# Patient Record
Sex: FEMALE | Race: WHITE | NOT HISPANIC OR LATINO | Employment: STUDENT | URBAN - METROPOLITAN AREA
[De-identification: names, ages, dates, MRNs, and addresses within clinical notes are randomized per-mention and may not be internally consistent; named-entity substitution may affect disease eponyms.]

---
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
## 2021-02-05 ENCOUNTER — OFFICE VISIT (OUTPATIENT)
Dept: DERMATOLOGY | Facility: CLINIC | Age: 21
End: 2021-02-05
Payer: COMMERCIAL

## 2021-02-05 VITALS — DIASTOLIC BLOOD PRESSURE: 78 MMHG | OXYGEN SATURATION: 98 % | SYSTOLIC BLOOD PRESSURE: 130 MMHG | HEART RATE: 87 BPM

## 2021-02-05 DIAGNOSIS — D48.5 NEOPLASM OF UNCERTAIN BEHAVIOR OF SKIN: Primary | ICD-10-CM

## 2021-02-05 DIAGNOSIS — L81.4 LENTIGO: ICD-10-CM

## 2021-02-05 DIAGNOSIS — D18.01 ANGIOMA OF SKIN: ICD-10-CM

## 2021-02-05 DIAGNOSIS — L08.89 PITTED KERATOLYSIS: ICD-10-CM

## 2021-02-05 DIAGNOSIS — L30.1 DYSHIDROTIC ECZEMA: ICD-10-CM

## 2021-02-05 DIAGNOSIS — D22.9 NEVUS: ICD-10-CM

## 2021-02-05 DIAGNOSIS — L21.9 DERMATITIS, SEBORRHEIC: ICD-10-CM

## 2021-02-05 PROCEDURE — 99204 OFFICE O/P NEW MOD 45 MIN: CPT | Mod: 25 | Performed by: PHYSICIAN ASSISTANT

## 2021-02-05 PROCEDURE — 88305 TISSUE EXAM BY PATHOLOGIST: CPT | Performed by: PATHOLOGY

## 2021-02-05 PROCEDURE — 11106 INCAL BX SKN SINGLE LES: CPT | Performed by: PHYSICIAN ASSISTANT

## 2021-02-05 RX ORDER — CLINDAMYCIN PHOSPHATE 10 MG/G
GEL TOPICAL
Qty: 60 G | Refills: 3 | Status: SHIPPED | OUTPATIENT
Start: 2021-02-05 | End: 2022-03-18

## 2021-02-05 RX ORDER — DEXTROAMPHETAMINE SACCHARATE, AMPHETAMINE ASPARTATE, DEXTROAMPHETAMINE SULFATE AND AMPHETAMINE SULFATE 2.5; 2.5; 2.5; 2.5 MG/1; MG/1; MG/1; MG/1
TABLET ORAL
COMMUNITY
Start: 2020-04-16 | End: 2024-05-15

## 2021-02-05 RX ORDER — ACETAMINOPHEN AND CODEINE PHOSPHATE 120; 12 MG/5ML; MG/5ML
0.35 SOLUTION ORAL DAILY
COMMUNITY
Start: 2021-01-30 | End: 2022-03-18

## 2021-02-05 NOTE — PROGRESS NOTES
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  On review of systems, there are no further skin complaints, patient is feeling otherwise well.  See patient intake sheet.  ROS of the following were done and are negative: Constitutional, Eyes, Ears, Nose,   Mouth, Throat, Cardiovascular, Respiratory, GI, Genitourinary, Musculoskeletal,   Psychiatric, Endocrine, Allergic/Immunologic.    PHYSICAL EXAM:   /78   Pulse 87   SpO2 98%   Skin exam performed as follows: Type 2 skin. Mood appropriate  Alert and Oriented X 3. Well developed, well nourished in no distress.  General appearance: Normal  Head including face: Normal  Eyes: conjunctiva and lids: Normal  Mouth: Lips, teeth, gums: Normal  Neck: Normal  Chest-breast/axillae: Normal  Back: Normal  Spleen and liver: Normal  Cardiovascular: Exam of peripheral vascular system by observation for swelling, varicosities, edema: Normal  Genitalia: groin, buttocks: Normal  Extremities: digits/nails (clubbing): Normal  Eccrine and Apocrine glands: Normal  Right upper extremity: Normal  Left upper extremity: Normal  Right lower extremity: Normal  Left lower extremity: Normal  Skin: Scalp and body hair: See below    Pt deferred exam of breasts, groin, buttocks: No    Other physical findings:  1. Multiple pigmented macules on extremities and trunk  2. Multiple pigmented macules on face, trunk and extremities  3. Multiple vascular papules on trunk, arms and legs  5. 2 mm medium brown papule on the left mid  abdomen  6. Pits on bilateral plantar surfaces  7. Few vessicles on the right instep   8. Flaking and erythema on the scalp         Except as noted above, no other signs of skin cancer or melanoma.     ASSESSMENT/PLAN:   Benign Full skin cancer screening today. . Patient with history of none  Advised on monthly self exams and 1 year  Patient Education: Appropriate brochures given.    1. Multiple benign appearing nevi on arms, legs and trunk. Discussed ABCDEs of melanoma and sunscreen.   2. Multiple lentigos on arms, legs and trunk. Advised benign, no treatment needed.  3. Multiple scattered angiomas. Advised benign, no treatment needed.   4. Atypical nevus vs dermal nevus on the left mid abdomen - advised. Incisional biopsy performed.Area anesthestized with lidocaine with epinephrine and dermablade used to remove the entire lesion. Patient tolerated well with no complications.   5. Pitted keratolysis on bilateral plantar surfaces - start clindamycin gel BID until resolved  6. Dyshidrotic eczema - she will get this recurrently on the hands and feet. Discussed condition and topical steroids if bothersome.   7. Seborrheic dermatitis on the scalp and forehead - she would like to try OTC tea tree oil for this.               Follow-up: pending path/PRN sooner    1.) Patient was asked about new and changing moles. YES  2.) Patient received a complete physical skin examination: YES  3.) Patient was counseled to perform a monthly self skin examination: YES  Scribed By: Hyacinth Hurtado, MS, PABINH

## 2021-02-05 NOTE — LETTER
2/5/2021         RE: Weston Lawson  [address]  [facility] [address]        Dear Colleague,    Thank you for referring your patient, Weston Lawson, to the [facility]. Please see a copy of my visit note below.    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  On review of systems, there are no further skin complaints, patient is feeling otherwise well.  See patient intake sheet.  ROS of the following were done and are negative: Constitutional, Eyes, Ears, Nose,   Mouth, Throat, Cardiovascular, Respiratory, GI, Genitourinary, Musculoskeletal,   Psychiatric, Endocrine, Allergic/Immunologic.    PHYSICAL EXAM:   /78   Pulse 87   SpO2 98%   Skin exam performed as follows: Type 2 skin. Mood appropriate  Alert and Oriented X 3. Well developed, well nourished in no distress.  General appearance: Normal  Head including face: Normal  Eyes: conjunctiva and lids: Normal  Mouth: Lips, teeth, gums: Normal  Neck: Normal  Chest-breast/axillae: Normal  Back: Normal  Spleen and liver: Normal  Cardiovascular: Exam of peripheral vascular system by observation for swelling, varicosities, edema: Normal  Genitalia: groin, buttocks: Normal  Extremities: digits/nails (clubbing): Normal  Eccrine and Apocrine glands: Normal  Right upper extremity: Normal  Left upper extremity: Normal  Right lower extremity: Normal  Left lower extremity: Normal  Skin: Scalp and body hair: See below    Pt deferred exam of breasts, groin, buttocks:  No    Other physical findings:  1. Multiple pigmented macules on extremities and trunk  2. Multiple pigmented macules on face, trunk and extremities  3. Multiple vascular papules on trunk, arms and legs  5. 2 mm medium brown papule on the left mid abdomen  6. Pits on bilateral plantar surfaces  7. Few vessicles on the right instep   8. Flaking and erythema on the scalp         Except as noted above, no other signs of skin cancer or melanoma.     ASSESSMENT/PLAN:   Benign Full skin cancer screening today. . Patient with history of none  Advised on monthly self exams and 1 year  Patient Education: Appropriate brochures given.    1. Multiple benign appearing nevi on arms, legs and trunk. Discussed ABCDEs of melanoma and sunscreen.   2. Multiple lentigos on arms, legs and trunk. Advised benign, no treatment needed.  3. Multiple scattered angiomas. Advised benign, no treatment needed.   4. Atypical nevus vs dermal nevus on the left mid abdomen - advised. Incisional biopsy performed.Area anesthestized with lidocaine with epinephrine and dermablade used to remove the entire lesion. Patient tolerated well with no complications.   5. Pitted keratolysis on bilateral plantar surfaces - start clindamycin gel BID until resolved  6. Dyshidrotic eczema - she will get this recurrently on the hands and feet. Discussed condition and topical steroids if bothersome.   7. Seborrheic dermatitis on the scalp and forehead - she would like to try OTC tea tree oil for this.               Follow-up: pending path/PRN sooner    1.) Patient was asked about new and changing moles. YES  2.) Patient received a complete physical skin examination: YES  3.) Patient was counseled to perform a monthly self skin examination: YES  Scribed By: Hyacinth Hurtado, MS, PAFreddyC          Again, thank you for allowing me to participate in the care of your patient.        Sincerely,        Hyacinth Hurtado PA-C

## 2021-02-05 NOTE — PATIENT INSTRUCTIONS
For the feet, this is pitted keratolysis    Start using clindamycin gel twice per day until the pits are resolved.     For the hands and feet, this is dyshidrotic eczema. This will be exacerbated with excessive moisture.     Wound Care Instructions     FOR SUPERFICIAL WOUNDS     [facility] [phone]                 AFTER 24 HOURS YOU SHOULD REMOVE THE BANDAGE AND BEGIN DAILY DRESSING CHANGES AS FOLLOWS:     1) Remove Dressing.     2) Clean and dry the area with tap water using a Q-tip or sterile gauze pad.     3) Apply Vaseline, Aquaphor, Polysporin ointment or Bacitracin ointment over entire wound.  Do NOT use Neosporin ointment.     4) Cover the wound with a band-aid, or a sterile non-stick gauze pad and micropore paper tape    REPEAT THESE INSTRUCTIONS AT LEAST ONCE A DAY UNTIL THE WOUND HAS COMPLETELY HEALED.    It is an old wives tale that a wound heals better when it is exposed to air and allowed to dry out. The wound will heal faster with a better cosmetic result if it is kept moist with ointment and covered with a bandage.    **Do not let the wound dry out.**    Supplies Needed:      *Cotton tipped applicators (Q-tips)    *Vaseline, Aquaphor, Polysporin or Bacitracin Ointment (NOT NEOSPORIN)    *Band-aids or non-stick gauze pads and micropore paper tape.      PATIENT INFORMATION:    During the healing process you will notice a number of changes. All wounds develop a small halo of redness surrounding the wound.  This means healing is occurring. Severe itching with extensive redness usually indicates sensitivity to the ointment or bandage tape used to dress the wound.  You should call our office if this develops.      Swelling  and/or discoloration around your surgical site is common, particularly when performed around the eye.    All wounds normally drain.  The larger the wound the more drainage there will be.  After 7-10 days, you will notice the wound beginning to shrink and new skin will  begin to grow.  The wound is healed when you can see skin has formed over the entire area.  A healed wound has a healthy, shiny look to the surface and is red to dark pink in color to normalize.  Wounds may take approximately 4-6 weeks to heal.  Larger wounds may take 6-8 weeks.  After the wound is healed you may discontinue dressing changes.    You may experience a sensation of tightness as your wound heals. This is normal and will gradually subside.    Your healed wound may be sensitive to temperature changes. This sensitivity improves with time, but if you re having a lot of discomfort, try to avoid temperature extremes.    Patients frequently experience itching after their wound appears to have healed because of the continue healing under the skin.  Plain Vaseline will help relieve the itching.      POSSIBLE COMPLICATIONS    BLEEDIN. Leave the bandage in place.  2. Use tightly rolled up gauze or a cloth to apply direct pressure over the bandage for 30  minutes.  3. Reapply pressure for an additional 30 minutes if necessary  4. Use additional gauze and tape to maintain pressure once the bleeding has stopped.

## 2021-02-09 LAB — COPATH REPORT: NORMAL

## 2021-02-10 ENCOUNTER — TELEPHONE (OUTPATIENT)
Dept: DERMATOLOGY | Facility: CLINIC | Age: 21
End: 2021-02-10

## 2021-02-10 NOTE — TELEPHONE ENCOUNTER
Called and LM for patient to call back in regards to biopsy results x1.    HERMES Quintana-BSN-PHN  [facility]  [phone]

## 2021-02-10 NOTE — TELEPHONE ENCOUNTER
----- Message from Hyacinth Hurtado PA-C sent at 2/10/2021  2:19 PM CST -----  Left mid abdomen mildly atypical nevus no further treatment

## 2021-02-10 NOTE — TELEPHONE ENCOUNTER
Patient called back.    Educated patient on biopsy results- compound dysplastic nevus with mild atypia, benign.    No further treatment needed.    Patient voiced understanding.    HERMES Quintana-BSN-N  [facility]  [phone]

## 2022-03-18 ENCOUNTER — OFFICE VISIT (OUTPATIENT)
Dept: OBGYN | Facility: CLINIC | Age: 22
End: 2022-03-18
Payer: COMMERCIAL

## 2022-03-18 VITALS
WEIGHT: 151 LBS | HEIGHT: 66 IN | SYSTOLIC BLOOD PRESSURE: 110 MMHG | DIASTOLIC BLOOD PRESSURE: 70 MMHG | BODY MASS INDEX: 24.27 KG/M2

## 2022-03-18 DIAGNOSIS — Z01.419 ENCOUNTER FOR GYNECOLOGICAL EXAMINATION WITHOUT ABNORMAL FINDING: Primary | ICD-10-CM

## 2022-03-18 DIAGNOSIS — Z30.41 ENCOUNTER FOR SURVEILLANCE OF CONTRACEPTIVE PILLS: ICD-10-CM

## 2022-03-18 PROBLEM — Z97.5 PRESENCE OF INTRAUTERINE CONTRACEPTIVE DEVICE: Status: RESOLVED | Noted: 2018-02-09 | Resolved: 2022-03-18

## 2022-03-18 PROCEDURE — G0145 SCR C/V CYTO,THINLAYER,RESCR: HCPCS | Performed by: NURSE PRACTITIONER

## 2022-03-18 PROCEDURE — 99385 PREV VISIT NEW AGE 18-39: CPT | Performed by: NURSE PRACTITIONER

## 2022-03-18 RX ORDER — ACETAMINOPHEN AND CODEINE PHOSPHATE 120; 12 MG/5ML; MG/5ML
0.35 SOLUTION ORAL DAILY
Qty: 90 TABLET | Refills: 4 | Status: SHIPPED | OUTPATIENT
Start: 2022-03-18 | End: 2023-03-14

## 2022-03-18 NOTE — PROGRESS NOTES
Weston is a 22 year old  female who presents for annual exam.     Besides routine health maintenance, would like to discuss her progesterone only pill. She takes it continuously and since her COVID series of vaccines she has been having a monthly cycle for 7 months.    HPI:    Patient here today for her annual GYN exam and Pap smear.  She has needs a refill of her progesterone only pills.  Since her Covid vaccine she has been having normal monthly 5 to 7-day cycles.  She has no intermenstrual bleeding and no bleeding with intercourse or exercise.  She overall likes the method and would like to continue.      GYNECOLOGIC HISTORY:    Patient's last menstrual period was 2022.  Regular menses? yes  Menses every 30 days.  Length of menses: 5 days  Her current contraception method is: oral contraceptives.  She  reports that she has never smoked. She has never used smokeless tobacco.  Patient is sexually active.  STD testing offered?  Declined     Last PHQ-9 score on record =   PHQ-9 SCORE 2018   PHQ-9 Total Score 2     Last GAD7 score on record =   RADHA-7 SCORE 2018   Total Score 6     Alcohol Score = 2    HEALTH MAINTENANCE:  Cholesterol: (No results found for: CHOL   Last Mammo: N/A, Result: not applicable, Next Mammo: screen age 40  Pap: done at her college last year in Mar/Apr  Colonoscopy:  N/A, Result: not applicable, Next Colonoscopy: screen age 45-50  Dexa:  N/A  Health maintenance updated:  yes    HISTORY:  OB History    Para Term  AB Living   0 0 0 0 0 0   SAB IAB Ectopic Multiple Live Births   0 0 0 0 0       Patient Active Problem List   Diagnosis   (none) - all problems resolved or deleted     Past Surgical History:   Procedure Laterality Date     NO HISTORY OF SURGERY        Social History     Tobacco Use     Smoking status: Never Smoker     Smokeless tobacco: Never Used   Substance Use Topics     Alcohol use: Yes      Problem (# of Occurrences) Relation (Name,Age of Onset)  "   Cancer (1) Other (PGfather): pancreatic or colon            Current Outpatient Medications   Medication Sig     amphetamine-dextroamphetamine (ADDERALL) 10 MG tablet TK 1 T PO IN THE MORNING AND AFTERNOON     norethindrone (MICRONOR) 0.35 MG tablet Take 1 tablet (0.35 mg) by mouth daily     No current facility-administered medications for this visit.     No Known Allergies    Past medical, surgical, social and family histories were reviewed and updated in EPIC.    ROS:   12 point review of systems negative other than symptoms noted below or in the HPI.  Genitourinary: Irregular Menses  No urinary frequency or dysuria, bladder or kidney problems, Normal menstrual cycles    EXAM:  /70   Ht 1.67 m (5' 5.75\")   Wt 68.5 kg (151 lb)   LMP 03/14/2022   BMI 24.56 kg/m     BMI: Body mass index is 24.56 kg/m .    PHYSICAL EXAM:  Constitutional:   Appearance: Well nourished, well developed, alert, in no acute distress  Neck:  Lymph Nodes:  No lymphadenopathy present    Thyroid:  Gland size normal, nontender, no nodules or masses present  on palpation  Chest:  Respiratory Effort:  Breathing unlabored  Cardiovascular:    Heart: Auscultation:  Regular rate, normal rhythm, no murmurs present  Breasts: Inspection of Breasts:  No lymphadenopathy present., Palpation of Breasts and Axillae:  No masses present on palpation, no breast tenderness., Axillary Lymph Nodes:  No lymphadenopathy present. and No nodularity, asymmetry or nipple discharge bilaterally.  Gastrointestinal:   Abdominal Examination:  Abdomen nontender to palpation, tone normal without rigidity or guarding, no masses present, umbilicus without lesions   Liver and Spleen:  No hepatomegaly present, liver nontender to palpation    Hernias:  No hernias present  Lymphatic: Lymph Nodes:  No other lymphadenopathy present  Skin:  General Inspection:  No rashes present, no lesions present, no areas of  discoloration  Neurologic:    Mental Status:  Oriented X3.  " Normal strength and tone, sensory exam                grossly normal, mentation intact and speech normal.    Psychiatric:   Mentation appears normal and affect normal/bright.         Pelvic Exam:  External Genitalia:     Normal appearance for age, no discharge present, no tenderness present, no inflammatory lesions present, color normal  Vagina:     Normal vaginal vault without central or paravaginal defects, no discharge present, no inflammatory lesions present, no masses present  Bladder:     Nontender to palpation  Urethra:   Urethral Body:  Urethra palpation normal, urethra structural support normal   Urethral Meatus:  No erythema or lesions present  Cervix:     Appearance healthy, no lesions present, nontender to palpation, no bleeding present  Uterus:     Uterus: firm, normal sized and nontender, midplane in position.   Adnexa:     No adnexal tenderness present, no adnexal masses present  Perineum:     Perineum within normal limits, no evidence of trauma, no rashes or skin lesions present  Anus:     Anus within normal limits, no hemorrhoids present  Inguinal Lymph Nodes:     No lymphadenopathy present  Pubic Hair:     Normal pubic hair distribution for age  Genitalia and Groin:     No rashes present, no lesions present, no areas of discoloration, no masses present      COUNSELING:   Special attention given to:        Regular exercise       Healthy diet/nutrition       Contraception    BMI: Body mass index is 24.56 kg/m .      ASSESSMENT:  22 year old female with satisfactory annual exam.    ICD-10-CM    1. Encounter for gynecological examination without abnormal finding  Z01.419 Pap screen only - recommended age 21 - 24 years   2. Encounter for surveillance of contraceptive pills  Z30.41 norethindrone (MICRONOR) 0.35 MG tablet       PLAN:  22-year-old female with a normal GYN exam.  Her Pap smear was collected and if it is normal she can repeat in 3 years.  She is okay to continue on progesterone only  pills.    MARLEEN Gleason CNP

## 2022-03-22 LAB
BKR LAB AP GYN ADEQUACY: NORMAL
BKR LAB AP GYN INTERPRETATION: NORMAL
BKR LAB AP HPV REFLEX: NO
BKR LAB AP LMP: NORMAL
BKR LAB AP PREVIOUS ABNORMAL: NORMAL
PATH REPORT.COMMENTS IMP SPEC: NORMAL
PATH REPORT.COMMENTS IMP SPEC: NORMAL
PATH REPORT.RELEVANT HX SPEC: NORMAL

## 2022-04-17 ENCOUNTER — HEALTH MAINTENANCE LETTER (OUTPATIENT)
Age: 22
End: 2022-04-17

## 2022-10-11 ENCOUNTER — OFFICE VISIT (OUTPATIENT)
Dept: MIDWIFE SERVICES | Facility: CLINIC | Age: 22
End: 2022-10-11
Payer: COMMERCIAL

## 2022-10-11 VITALS — WEIGHT: 143.6 LBS | HEIGHT: 68 IN | BODY MASS INDEX: 21.76 KG/M2

## 2022-10-11 DIAGNOSIS — Z11.8 ENCOUNTER FOR SCREENING EXAMINATION FOR CHLAMYDIAL INFECTION: ICD-10-CM

## 2022-10-11 DIAGNOSIS — B37.31 YEAST VAGINITIS: ICD-10-CM

## 2022-10-11 DIAGNOSIS — B37.31 YEAST INFECTION OF THE VAGINA: Primary | ICD-10-CM

## 2022-10-11 DIAGNOSIS — Z11.3 SCREENING FOR GONORRHEA: ICD-10-CM

## 2022-10-11 DIAGNOSIS — N93.0 BLEEDING AFTER INTERCOURSE: ICD-10-CM

## 2022-10-11 DIAGNOSIS — N92.6 MISSED PERIOD: ICD-10-CM

## 2022-10-11 DIAGNOSIS — N89.8 VAGINAL DISCHARGE: Primary | ICD-10-CM

## 2022-10-11 DIAGNOSIS — Z11.3 ROUTINE SCREENING FOR STI (SEXUALLY TRANSMITTED INFECTION): ICD-10-CM

## 2022-10-11 LAB
CLUE CELLS: ABNORMAL
HCG UR QL: NEGATIVE
TRICHOMONAS, WET PREP: ABNORMAL
WBC'S/HIGH POWER FIELD, WET PREP: ABNORMAL
YEAST, WET PREP: PRESENT

## 2022-10-11 PROCEDURE — 87491 CHLMYD TRACH DNA AMP PROBE: CPT | Performed by: ADVANCED PRACTICE MIDWIFE

## 2022-10-11 PROCEDURE — 87591 N.GONORRHOEAE DNA AMP PROB: CPT | Performed by: ADVANCED PRACTICE MIDWIFE

## 2022-10-11 PROCEDURE — 99214 OFFICE O/P EST MOD 30 MIN: CPT | Performed by: ADVANCED PRACTICE MIDWIFE

## 2022-10-11 PROCEDURE — 87210 SMEAR WET MOUNT SALINE/INK: CPT | Performed by: ADVANCED PRACTICE MIDWIFE

## 2022-10-11 PROCEDURE — 81025 URINE PREGNANCY TEST: CPT | Performed by: ADVANCED PRACTICE MIDWIFE

## 2022-10-11 RX ORDER — FLUCONAZOLE 150 MG/1
150 TABLET ORAL ONCE
Qty: 2 TABLET | Refills: 0 | Status: SHIPPED | OUTPATIENT
Start: 2022-10-11 | End: 2022-10-11

## 2022-10-11 RX ORDER — FLUCONAZOLE 150 MG/1
150 TABLET ORAL ONCE
Qty: 1 TABLET | Refills: 0 | Status: SHIPPED | OUTPATIENT
Start: 2022-10-11 | End: 2022-10-11

## 2022-10-11 NOTE — PROGRESS NOTES
"SUBJECTIVE:  Weston Lawson  is a 22 year old female  who presents for an STI screen due to abnormal vaginal discharge and bleeding after intercourse. Patient states that her partners has not been tested for STIs.  She does have 2 new partner.  She is currently using POPs for birth control.    Patient complains of irregular spotting since getting the covid vaccine in 4/2021.  Having normal menstrual cycles. Noticed yellow vaginal discharge with a thick consistency and a \"cheese\" odor 2 weeks ago. Had vaginal bleeding on 10/1/22 and 10/9/22 after having sex. Is not spotting today. Denies pain or itchiness. Reports taking POPs everyday and on time. Accepts a pregnancy test today.    Partners: 2 partners  Practices: oral and vaginal sex  Prevention against pregnancy: POPs  Protection against sexually transmitted infections: inconsistent use of condoms   Past history of sexually transmitted infections: none      Reported having an UTI 2 weeks ago and prescribed an abx. Completed treatment. No urinary symptoms today.      Patient Active Problem List   Diagnosis   (none) - all problems resolved or deleted     Past Medical History:   Diagnosis Date     ADHD 2018    Dr. Leroy Bronson at [facility]     Anxiety     has been in counseling. No meds.      Intractable chronic migraine without aura      Past Surgical History:   Procedure Laterality Date     NO HISTORY OF SURGERY       Current Outpatient Medications   Medication Sig Dispense Refill     amphetamine-dextroamphetamine (ADDERALL) 10 MG tablet TK 1 T PO IN THE MORNING AND AFTERNOON       fluconazole (DIFLUCAN) 150 MG tablet Take 1 tablet (150 mg) by mouth once for 1 dose 1 tablet 0     norethindrone (MICRONOR) 0.35 MG tablet Take 1 tablet (0.35 mg) by mouth daily 90 tablet 4     fluconazole (DIFLUCAN) 150 MG tablet Take 1 tablet (150 mg) by mouth once for 1 dose Repeat on day 3 if symptoms persist. 2 tablet 0     No Known " "Allergies    Health maintenance updated:  yes    ROS:  12 point review of systems negative other than symptoms noted below or in the HPI.  Genitourinary: Vaginal Discharge    PHYSICAL EXAM:    Ht 1.715 m (5' 7.5\")   Wt 65.1 kg (143 lb 9.6 oz)   BMI 22.16 kg/m      General appearance: healthy, alert, no distress and cooperative.  Pelvic Exam:  Vulva: No lesions, no adenopathy, BUS WNL  Vagina: Moist, pink, yellow discharge, well rugated, no lesions  Cervix: smooth, pink, no visible lesions, smooth yellow/white thick discharge noted at cervix  Bimanual exam:  deferred  Rectal exam: deferred    ASSESSMENT/PLAN    ICD-10-CM    1. Vaginal discharge  N89.8 NEISSERIA GONORRHOEA PCR     CHLAMYDIA TRACHOMATIS PCR     Wet prep - Clinic Collect      2. Bleeding after intercourse  N93.0 NEISSERIA GONORRHOEA PCR     CHLAMYDIA TRACHOMATIS PCR     Wet prep - Clinic Collect      3. Screening for gonorrhea  Z11.3       4. Encounter for screening examination for chlamydial infection  Z11.8       5. Routine screening for STI (sexually transmitted infection)  Z11.3       6. Missed period  N92.6 HCG Qual, Urine ([id number])     HCG Qual, Urine ([id number])      7. Yeast vaginitis  B37.31 fluconazole (DIFLUCAN) 150 MG tablet          Results for orders placed or performed in visit on 10/11/22   HCG Qual, Urine ([id number])     Status: Normal   Result Value Ref Range    hCG Urine Qualitative Negative Negative   Wet prep - Clinic Collect     Status: Abnormal    Specimen: Vagina; Swab   Result Value Ref Range    Trichomonas Absent Absent    Yeast Present (A) Absent    Clue Cells Absent Absent    WBCs/high power field 1+ (A) None        COUNSELING    Condoms strongly recommended along with safe sex practices.    Contraception methods discussed.      Partner(s) encouraged  to be screened/treated.    Reportable STI's discussed.    Follow up as needed    LUCILA not required (except pregnancy and high risk behavior)    Recommend rescreen within 3-6 " months.    Return to clinic or call with questions or concerns    Discussed with patient on performing a wet mount (patient or provider collect) for infection, vaginal swab and lab panel for STI screening. Patient agreed for provider to do a wet mount and vaginal swab for CT/GC, will leave an urine pregnancy test. Patient declined the lab panel.    [employer] message sent to patient on wet mount and pregnancy result. Diflucan sent to pharmacy for treating yeast infection. Pregnancy test was negative.    20 minutes spent on the date of the encounter doing chart review, history and exam, documentation and further activities per the note      MICKI Perales Student  [facility]    I, Luca Beltran, am serving as a scribe; to document services personally performed by Purnima HAWLEY CNM- based on data collection and the provider's statements to me.    Provider Disclosure:  I agree with above History, Review of Systems, Physical exam and Plan. I have reviewed the content of the documentation and have edited it as needed. I have personally performed the services documented here and the documentation accurately represents those services and the decisions I have made.      Purnima Queen, DNP, APRN, CNM

## 2022-10-11 NOTE — PATIENT INSTRUCTIONS
Sexually Transmitted Infections (STIs)    Many STIs do not have any symptoms and can be transmitted through any type of sex, not just intercourse, but also anal, oral, and other types of sex play. Some STIs are transmitted by bacteria and others by viruses. It is important to keep yourself safe and infection free as many STIs have long term side effects.     Common STIs    Chlamydia  Gonorrhea   Genital Herpes  Genital warts/HPV (some strains of HPV cause cervical cancer)  Hepatitis A, B, & C  Syphilis   Trichomoniasis     Who should be screened?    Screening is available to anyone who wishes to be test, some tests are from a blood draw and some are a vaginal swab  Screening should be done even if people have no symptoms or feel fine  Women who have had unprotected sex with a new partner  Women who have had sex with more than one partner should be screened yearly for gonorrhea and chlamydia   The CDC also recommends women aged 25 and younger should be screened yearly for gonorrhea and chlamydia  Everyone should be screened at least once for HIV  Pregnant women are screened for STIs at their first OB visit  We can do all the screenings you need right here in clinic    If any screenings come back positive we will get you treated with the appropriate medications. Your partner (s) will also need to be screened and treated by their providers.    How to protect yourself    The most effective way to protect yourself from getting an STI is by using a condom every time that you have sex. (Remember male condoms made out of natural materials do not protect against STIs)  Ask us about vaccines, if you are under the age of 26 we can start a vaccine series for HPV prevention.  If you or your partner have herpes make sure to use a condom or abstain from sexual intercourse/genital contact when you have active lesions. Also avoid oral sex when you or your partner have cold sores    There is no surefire way to prevent all STIs from  being transmitted but proper screening and the methods above can help to reduce your risk!    Please ask your midwife at [facility]  if you have any questions

## 2022-10-12 DIAGNOSIS — A74.9 CHLAMYDIAL INFECTION: Primary | ICD-10-CM

## 2022-10-12 LAB
C TRACH DNA SPEC QL NAA+PROBE: POSITIVE
N GONORRHOEA DNA SPEC QL NAA+PROBE: NEGATIVE

## 2022-10-12 RX ORDER — DOXYCYCLINE 100 MG/1
100 CAPSULE ORAL 2 TIMES DAILY
Qty: 14 CAPSULE | Refills: 1 | Status: SHIPPED | OUTPATIENT
Start: 2022-10-12 | End: 2022-10-19

## 2022-10-23 ENCOUNTER — HEALTH MAINTENANCE LETTER (OUTPATIENT)
Age: 22
End: 2022-10-23

## 2022-11-09 ENCOUNTER — MYC MEDICAL ADVICE (OUTPATIENT)
Dept: MIDWIFE SERVICES | Facility: CLINIC | Age: 22
End: 2022-11-09

## 2022-11-09 NOTE — TELEPHONE ENCOUNTER
Routing pt [employer] message to provider to advise.  10/11/22 YADIRA Hennessy RN on 11/9/2022 at 11:22 AM

## 2022-11-09 NOTE — TELEPHONE ENCOUNTER
Recommend evisit for yeast treatment and then if no relief follow up testing within a few days to determine correct treatment.    MARLEEN Robles, CNM

## 2023-03-14 ENCOUNTER — OFFICE VISIT (OUTPATIENT)
Dept: MIDWIFE SERVICES | Facility: CLINIC | Age: 23
End: 2023-03-14
Payer: COMMERCIAL

## 2023-03-14 VITALS
BODY MASS INDEX: 23.78 KG/M2 | HEIGHT: 66 IN | HEART RATE: 68 BPM | SYSTOLIC BLOOD PRESSURE: 108 MMHG | WEIGHT: 148 LBS | DIASTOLIC BLOOD PRESSURE: 60 MMHG

## 2023-03-14 DIAGNOSIS — Z30.41 ENCOUNTER FOR SURVEILLANCE OF CONTRACEPTIVE PILLS: ICD-10-CM

## 2023-03-14 DIAGNOSIS — Z01.419 ENCOUNTER FOR GYNECOLOGICAL EXAMINATION WITHOUT ABNORMAL FINDING: Primary | ICD-10-CM

## 2023-03-14 PROCEDURE — 99395 PREV VISIT EST AGE 18-39: CPT | Performed by: ADVANCED PRACTICE MIDWIFE

## 2023-03-14 RX ORDER — ACETAMINOPHEN AND CODEINE PHOSPHATE 120; 12 MG/5ML; MG/5ML
0.35 SOLUTION ORAL DAILY
Qty: 90 TABLET | Refills: 3 | Status: SHIPPED | OUTPATIENT
Start: 2023-03-14 | End: 2024-02-19

## 2023-03-14 ASSESSMENT — ANXIETY QUESTIONNAIRES
IF YOU CHECKED OFF ANY PROBLEMS ON THIS QUESTIONNAIRE, HOW DIFFICULT HAVE THESE PROBLEMS MADE IT FOR YOU TO DO YOUR WORK, TAKE CARE OF THINGS AT HOME, OR GET ALONG WITH OTHER PEOPLE: NOT DIFFICULT AT ALL
3. WORRYING TOO MUCH ABOUT DIFFERENT THINGS: NOT AT ALL
7. FEELING AFRAID AS IF SOMETHING AWFUL MIGHT HAPPEN: NOT AT ALL
5. BEING SO RESTLESS THAT IT IS HARD TO SIT STILL: NOT AT ALL
6. BECOMING EASILY ANNOYED OR IRRITABLE: SEVERAL DAYS
2. NOT BEING ABLE TO STOP OR CONTROL WORRYING: NOT AT ALL
GAD7 TOTAL SCORE: 2
GAD7 TOTAL SCORE: 2
1. FEELING NERVOUS, ANXIOUS, OR ON EDGE: SEVERAL DAYS

## 2023-03-14 ASSESSMENT — PATIENT HEALTH QUESTIONNAIRE - PHQ9
SUM OF ALL RESPONSES TO PHQ QUESTIONS 1-9: 7
5. POOR APPETITE OR OVEREATING: NOT AT ALL

## 2023-03-14 NOTE — PATIENT INSTRUCTIONS
Preventive Health Recommendations  Female Ages 21 - 39  Yearly exam:   See your health care provider every year in order to  1. Review health changes.  2. Discuss preventive care.    3. Review your medicines if you your provider has prescribed any.    You do not need a Pap test if your uterus was removed (hysterectomy) and you have not had cancer.  You should be tested each year for STIs (sexually transmitted diseases) if you're at risk.   Talk to your provider about how often to have your cholesterol checked, about every 5 years if normal.  If you are at risk for diabetes, you should have a diabetes test (fasting glucose).  Vitamin D deficiency screening and thyroid disease screening is also recommended every 3-5 years depending on risk factors or more often if symptomatic  PAP Smear:   Until age 30: Get a Pap test every three years (more often if you have had an abnormal result)    After age 30: Talk to your provider about whether you should have a Pap test every 3 years or have a Pap test with HPV screening every 5 years.   Shots: Get a flu shot each year. Get a tetanus shot every 10 years.   Nutrition:   Eat at least 5 servings of fruits and vegetables each day  Eat REAL food, stay away from processed foods.  Eat whole-grain bread, whole-wheat pasta and brown rice instead of white grains and rice.  For bone health:  Eat calcium-rich foods or take calcium pills (500 to 600 mg) twice a day with food (1200 mg per day). Also take vitamin D (2000 IUs) each day.     Lifestyle  Exercise regularly (30 minutes a day, 5 days of the week). This will help you control your weight and prevent disease.  Weight bearing exercise such as weight lifting, walking, running and yoga will be beneficial later in life preventing osteoporosis   Limit alcohol to one drink per day.  No smoking.   Wear sunscreen to prevent skin cancer.  See your dentist every six months to one year for an exam and cleaning depending on their  recommendation  Get an eye exam every two years or more often if you wear glasses or contacts.    Progesterone Only Oral Contraceptive Pills (POPs/Minipill)      Minipills contain only progesterone. Combined pills contain both estrogen and progesterone. Minipills are a great option for women who are breastfeeding or for women who cannot take estrogen. POPs do not increase your risk for blood clots like combined OCP's do. Women who are breastfeeding should call to change contraceptive type when they are done breastfeeding.     How it works:    The minipill effectively prevents pregnancy by actions of the progesterone hormone on various parts of the reproductive system. It makes cervical mucus too thick for sperm to move easily through, it makes the lining of the uterus too thin for a fertilized egg to grow, and it sometimes prevents ovulation all together. The minipill is slightly less effective than combined pills, the pregnancy rate is about 3%.    How to take the minipill:    Minipills come in packs containing several weeks of pills, each pill is marked in the packs so that one pill is taken every day of the week    Unlike combined OCPs there are no placebo pills    Start the first pack of pills on the first day of your menstrual period, if you are postpartum you can start the pills right away    It is extremely important to take the pill AT THE SAME TIME EVERY DAY (at least within the same hour)    As soon as you finish one pack, start another    If you experience illness such as nausea and vomiting or diarrhea use a backup method for 7 days       Missed/forgotten pills:    If you miss one pill take it as soon as you remember, take your next pill the next day at your usual time and used a backup method like a condom for 7 days if it was more than 3 hours late            If you miss two pills do not take the missed pills, just continue taking your minipill as you normally would, but make sure to use a backup  method for the duration of that package, until you start a new one    If you miss more than two pills please contact the clinic      Menstrual changes:    Women taking the minipill often experience short/irregular cycles or may not have a period at all    You may also experience some spotting or bleeding in between your cycles      Contact the clinic if:    You miss one or two pills and then do not get a period    If you have been experiencing normal periods and unexpectedly miss one    If you have any symptoms of pregnancy such as breast tenderness, increased tiredness, or cramping in your lower abdomen    If you have heavy or prolonged bleeding, abdominal pain, fever, or cramps    You can stop taking the minipill if you wish to get pregnant.     Please call with any questions or concerns:    [facility]     [phone]

## 2023-03-14 NOTE — PROGRESS NOTES
Weston is a 23 year old  female who presents for annual exam.     Besides routine health maintenance, she has no other health concerns today .  HPI:  Weston is here for annual. She is doing well, works at [employer]. She had some issues with regulating cycles after covid vaccine and booster but now back to normal. Happy on POPs, would like refill, no issues with BTB. No currently sexually active. Taking daily probiotic due to recent issues with yeast infections, seems to be working well.   Menses are regular q 28-30 days and normal  Patient's last menstrual period was 2023..   Using oral contraceptives for contraception.    She is not currently considering pregnancy.    REPRODUCTIVE/GYNECOLOGIC HISTORY:  Weston is not sexually active with male partner(s) and is not currently in monogamous relationship.   STI testing offered?  Declined  History of abnormal Pap smear:  No  SOCIAL HISTORY  Abuse: does not report having previously been physical or sexually abused.    Do you feel safe in your environment? YES    She  reports that she has never smoked. She has never used smokeless tobacco.      Last PHQ-9 score on record =   PHQ-9 SCORE 3/14/2023   PHQ-9 Total Score 7     Last GAD7 score on record =   RADHA-7 SCORE 3/14/2023   Total Score 2     Alcohol Score = 2    HEALTH MAINTENANCE:  Cholesterol: (No results found for: CHOL History of abnormal lipids: No  Mammo: never . History of abnormal Mammo: Not applicable,   .  Regular Self Breast Exams: Yes  TSH: (No results found for: TSH )  Pap; (  Lab Results   Component Value Date    GYNINTERP  2022     Negative for Intraepithelial Lesion or Malignancy (NILM)    )  Immunizations up to date: yes   (Gardasil, Tdap, Flu)  Health maintenance updated:  yes    HEALTHY HABITS  Eating habits: eats regular meals  Have you had an eye exam in the last two years? YES   Do you routinely see the dentist once or twice yearly? YES    HISTORY:  OB History     "Para Term  AB Living   0 0 0 0 0 0   SAB IAB Ectopic Multiple Live Births   0 0 0 0 0     Past Medical History:   Diagnosis Date     ADHD     Dr. Leroy Bronson at [facility]     Anxiety     has been in counseling. No meds.      Intractable chronic migraine without aura      Past Surgical History:   Procedure Laterality Date     NO HISTORY OF SURGERY       Family History   Problem Relation Age of Onset     Cancer Other         pancreatic or colon     Social History     Socioeconomic History     Marital status: Single   Tobacco Use     Smoking status: Never     Smokeless tobacco: Never   Substance and Sexual Activity     Alcohol use: Yes     Drug use: No     Sexual activity: Yes     Partners: Male     Birth control/protection: OCP       Current Outpatient Medications:      amphetamine-dextroamphetamine (ADDERALL) 10 MG tablet, TK 1 T PO IN THE MORNING AND AFTERNOON, Disp: , Rfl:      norethindrone (MICRONOR) 0.35 MG tablet, Take 1 tablet (0.35 mg) by mouth daily, Disp: 90 tablet, Rfl: 3   No Known Allergies    Past medical, surgical, social and family history were reviewed and updated in EPIC.    ROS:   12 point review of systems negative other than symptoms noted below or in the HPI.    PHYSICAL EXAM:  /60   Pulse 68   Ht 1.676 m (5' 6\")   Wt 67.1 kg (148 lb)   LMP 2023   BMI 23.89 kg/m     BMI: Body mass index is 23.89 kg/m .  Constitutional: healthy, alert and no distress  Neck: symmetrical, thyroid normal size, no masses present, no lymphadenopathy present.   Cardiovascular: RRR, no murmurs present  Respiratory: breathing unlabored, lungs CTA bilaterally  Breast:normal without masses, tenderness or nipple discharge and no palpable axillary masses or adenopathy  Gastrointestinal: abdomen soft, non-tender, bowel sounds present  PELVIC EXAM:  Vulva: No lesions, no adenopathy, BUS WNL  Vagina: Moist, pink, discharge normal  well rugated, no " lesions  Cervix:smooth, pink, no visible lesions  Uterus: Normal size, non-tender, mobile  Ovaries: No masses palpated  Rectal exam: deferred    ASSESSMENT/PLAN:    ICD-10-CM    1. Encounter for gynecological examination without abnormal finding  Z01.419       2. Encounter for surveillance of contraceptive pills  Z30.41 norethindrone (MICRONOR) 0.35 MG tablet        No results found for any visits on 03/14/23.      COUNSELING:   Reviewed preventive health counseling, as reflected in patient instructions       Regular exercise       Healthy diet/nutrition       Vision screening       Contraception       Safe sex practices/STD prevention   reports that she has never smoked. She has never used smokeless tobacco.  GC screening discussed, testing in october and treated for chlamydia, if she has new partner or a concern we will do testing then  Due for pap 2025  Discussed starting fast labs next year at age 24, will do lipid, TSH, and CMP  Refill sent for POPs for the year, discussed timing importance and back up method, call with any questions or concerns    MARLEEN Robles, CNM

## 2024-02-19 ENCOUNTER — MYC MEDICAL ADVICE (OUTPATIENT)
Dept: OBGYN | Facility: CLINIC | Age: 24
End: 2024-02-19
Payer: COMMERCIAL

## 2024-02-19 ENCOUNTER — MYC REFILL (OUTPATIENT)
Dept: MIDWIFE SERVICES | Facility: CLINIC | Age: 24
End: 2024-02-19
Payer: COMMERCIAL

## 2024-02-19 DIAGNOSIS — Z30.41 ENCOUNTER FOR SURVEILLANCE OF CONTRACEPTIVE PILLS: ICD-10-CM

## 2024-02-19 RX ORDER — ACETAMINOPHEN AND CODEINE PHOSPHATE 120; 12 MG/5ML; MG/5ML
0.35 SOLUTION ORAL DAILY
Qty: 28 TABLET | Refills: 0 | Status: SHIPPED | OUTPATIENT
Start: 2024-02-19 | End: 2024-02-19

## 2024-02-19 RX ORDER — ACETAMINOPHEN AND CODEINE PHOSPHATE 120; 12 MG/5ML; MG/5ML
0.35 SOLUTION ORAL DAILY
Qty: 84 TABLET | Refills: 0 | Status: SHIPPED | OUTPATIENT
Start: 2024-02-19 | End: 2024-05-15

## 2024-02-19 NOTE — TELEPHONE ENCOUNTER
Requested Prescriptions   Pending Prescriptions Disp Refills    norethindrone (MICRONOR) 0.35 MG tablet 90 tablet 3     Sig: Take 1 tablet (0.35 mg) by mouth daily       Contraceptives Protocol Passed - 2/19/2024 10:46 AM        Passed - Patient is not a current smoker if age is 35 or older        Passed - Recent (12 mo) or future (30 days) visit within the authorizing provider's specialty     The patient must have completed an in-person or virtual visit within the past 12 months or has a future visit scheduled within the next 90 days with the authorizing provider s specialty.  Urgent care and e-visits do not quality as an office visit for this protocol.          Passed - Medication is active on med list        Passed - No active pregnancy on record        Passed - No positive pregnancy test in past 12 months           Last Written Prescription Date:  3/14/23  Last Fill Quantity: 90,  # refills: 3   Last office visit: 3/14/2023 ; last virtual visit: Visit date not found with prescribing provider:  Blanche Hoover   Future Office Visit:  none    Rx sent for 1 time refill to preferred pharmacy. Pt needs appt for additional refills       Lisa Freeman RN on 2/19/2024 at 11:32 AM

## 2024-05-15 ENCOUNTER — OFFICE VISIT (OUTPATIENT)
Dept: MIDWIFE SERVICES | Facility: CLINIC | Age: 24
End: 2024-05-15
Payer: COMMERCIAL

## 2024-05-15 VITALS
DIASTOLIC BLOOD PRESSURE: 68 MMHG | HEIGHT: 67 IN | BODY MASS INDEX: 22.22 KG/M2 | WEIGHT: 141.6 LBS | SYSTOLIC BLOOD PRESSURE: 108 MMHG

## 2024-05-15 DIAGNOSIS — Z11.3 SCREEN FOR SEXUALLY TRANSMITTED DISEASES: ICD-10-CM

## 2024-05-15 DIAGNOSIS — Z01.419 ENCOUNTER FOR GYNECOLOGICAL EXAMINATION WITHOUT ABNORMAL FINDING: Primary | ICD-10-CM

## 2024-05-15 DIAGNOSIS — Z30.41 ENCOUNTER FOR SURVEILLANCE OF CONTRACEPTIVE PILLS: ICD-10-CM

## 2024-05-15 DIAGNOSIS — Z11.8 SCREENING FOR CHLAMYDIAL DISEASE: ICD-10-CM

## 2024-05-15 PROCEDURE — 87491 CHLMYD TRACH DNA AMP PROBE: CPT | Performed by: ADVANCED PRACTICE MIDWIFE

## 2024-05-15 PROCEDURE — 99395 PREV VISIT EST AGE 18-39: CPT | Performed by: ADVANCED PRACTICE MIDWIFE

## 2024-05-15 PROCEDURE — 87591 N.GONORRHOEAE DNA AMP PROB: CPT | Performed by: ADVANCED PRACTICE MIDWIFE

## 2024-05-15 RX ORDER — AMPHETAMINE 15 MG/1
TABLET, EXTENDED RELEASE ORAL
COMMUNITY
Start: 2024-01-01

## 2024-05-15 RX ORDER — ACETAMINOPHEN AND CODEINE PHOSPHATE 120; 12 MG/5ML; MG/5ML
0.35 SOLUTION ORAL DAILY
Qty: 112 TABLET | Refills: 3 | Status: SHIPPED | OUTPATIENT
Start: 2024-05-15

## 2024-05-15 NOTE — PROGRESS NOTES
.Weston is a 24 year old  female who presents for annual exam.     Besides routine health maintenance, she has no other health concerns today .  HPI:  Bought a condo recently in [place] [facility]. Doing well. In a monogamous relationship.    No issues with her POPs and wants to continue them.  She had chlamydia about 1.5 years ago and was treated. She states she started getting consistent yeast infections after that. They stopped about 3 months ago.    Patient's last menstrual period was 2024 (approximate)..   Using oral contraceptives for contraception.    She is not currently considering pregnancy.    REPRODUCTIVE/GYNECOLOGIC HISTORY:  Weston is sexually active with male partner(s) and is currently in monogamous relationship.   STI testing offered?  Accepted  History of abnormal Pap smear:  No  SOCIAL HISTORY  Abuse: does not report having previously been physical or sexually abused.    Do you feel safe in your environment? YES      She  reports that she has never smoked. She has never used smokeless tobacco.    Last PHQ-9 score on record =       3/14/2023     2:48 PM   PHQ-9 SCORE   PHQ-9 Total Score 7     Last GAD7 score on record =       3/14/2023     2:48 PM   RADHA-7 SCORE   Total Score 2   Phq2 (    [employer],All Rights Reserved. Used With Permission. Developed By Lizbeth Arceo,Morena Flowers,Seng Mendieta And Colleagues,With An Educational [place] From [employer].)    Question 5/15/2024  8:27 AM CDT - Filed by Patient   Q1: Little interest or pleasure in doing things Not at all   Q2: Feeling down, depressed or hopeless Not at all   PHQ-2 Score (range: 0 - 6) 0     Alcohol Score = 2    HEALTH MAINTENANCE:      Overdue          Never done ADVANCE CARE PLANNING (Every 5 Years)     Never done HIV SCREENING (Once)     Never done HEPATITIS C SCREENING (Once)     SEP 1  2023 COVID-19 Vaccine (3 - 2023-24 season)  Last completed:  PHQ-2 (once per calendar year) (Yearly,  "January to December)  Last completed: Mar 14, 2023     MAR 14  2024 YEARLY PREVENTIVE VISIT (Yearly)  Last completed: Mar 14, 2023       HISTORY:  OB History    Para Term  AB Living   0 0 0 0 0 0   SAB IAB Ectopic Multiple Live Births   0 0 0 0 0     Past Medical History:   Diagnosis Date    ADHD     Dr. Leroy Bronson at [facility]    Anxiety     has been in counseling. No meds.     Intractable chronic migraine without aura      Past Surgical History:   Procedure Laterality Date    NO HISTORY OF SURGERY       Family History   Problem Relation Age of Onset    Cancer Other         pancreatic or colon     Social History     Socioeconomic History    Marital status: Single   Tobacco Use    Smoking status: Never    Smokeless tobacco: Never   Substance and Sexual Activity    Alcohol use: Yes    Drug use: No    Sexual activity: Yes     Partners: Male     Birth control/protection: OCP       Current Outpatient Medications:     DYANAVEL XR 15 MG ROXANNE, , Disp: , Rfl:     norethindrone (MICRONOR) 0.35 MG tablet, Take 1 tablet (0.35 mg) by mouth daily, Disp: 112 tablet, Rfl: 3   No Known Allergies    Past medical, surgical, social and family history were reviewed and updated in EPIC.    ROS:   12 point review of systems negative other than symptoms noted below or in the HPI.    PHYSICAL EXAM:  /68   Ht 1.689 m (5' 6.5\")   Wt 64.2 kg (141 lb 9.6 oz)   LMP 2024 (Approximate)   BMI 22.51 kg/m     BMI: Body mass index is 22.51 kg/m .  Constitutional: healthy, alert and no distress  Neck: symmetrical, thyroid normal size, no masses present, no lymphadenopathy present.   Cardiovascular: RRR, no murmurs present  Respiratory: breathing unlabored, lungs CTA bilaterally  Breast:normal without masses, tenderness or nipple discharge and no palpable axillary masses or adenopathy  Gastrointestinal: abdomen soft, non-tender, bowel sounds present  PELVIC EXAM:  Vulva: No lesions, " no adenopathy, BUS WNL  Vagina: Moist, pink, discharge normal  well rugated, no lesions  Rectal exam: deferred    ASSESSMENT/PLAN:    ICD-10-CM    1. Encounter for gynecological examination without abnormal finding  Z01.419       2. Encounter for surveillance of contraceptive pills  Z30.41 norethindrone (MICRONOR) 0.35 MG tablet      3. Screening for chlamydial disease  Z11.8 CHLAMYDIA TRACHOMATIS PCR      4. Screen for sexually transmitted diseases  Z11.3 NEISSERIA GONORRHOEA PCR        No results found for any visits on 05/15/24.      COUNSELING:   Reviewed preventive health counseling, as reflected in patient instructions   reports that she has never smoked. She has never used smokeless tobacco.    Rx for POPs sent.     Reviewed that if a yeast infection comes back, I would like her to come in for an MVP and evaluation to ensure proper treatment.     Plan for annual visit in 1 year and to contact us if problems arise before.     Purnima Queen, DNP, APRN, CNM

## 2024-05-16 LAB
C TRACH DNA SPEC QL NAA+PROBE: NEGATIVE
N GONORRHOEA DNA SPEC QL NAA+PROBE: NEGATIVE

## 2025-05-23 ENCOUNTER — RESULTS FOLLOW-UP (OUTPATIENT)
Dept: OBGYN | Facility: CLINIC | Age: 25
End: 2025-05-23